# Patient Record
(demographics unavailable — no encounter records)

---

## 2025-06-26 NOTE — HISTORY OF PRESENT ILLNESS
[FreeTextEntry1] : 45 year old woman  who is 14 weeks pregnant (REINALDO 25) with GDM here for evaluation She is on Insulin No ASA yet EKG normal

## 2025-06-26 NOTE — DISCUSSION/SUMMARY
[FreeTextEntry1] : 45 year old woman  who is 14 weeks pregnant (REINALDO 25) with GDM here for evaluation She is on Insulin No ASA yet EKG normal Would recommend  mg daily given AMA and GDM if OK with OB  [EKG obtained to assist in diagnosis and management of assessed problem(s)] : EKG obtained to assist in diagnosis and management of assessed problem(s)